# Patient Record
Sex: MALE | Race: WHITE | NOT HISPANIC OR LATINO | URBAN - METROPOLITAN AREA
[De-identification: names, ages, dates, MRNs, and addresses within clinical notes are randomized per-mention and may not be internally consistent; named-entity substitution may affect disease eponyms.]

---
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
## 2025-05-17 ENCOUNTER — OFFICE VISIT (OUTPATIENT)
Dept: URGENT CARE | Facility: CLINIC | Age: 20
End: 2025-05-17
Payer: COMMERCIAL

## 2025-05-17 ENCOUNTER — RESULTS FOLLOW-UP (OUTPATIENT)
Dept: URGENT CARE | Facility: CLINIC | Age: 20
End: 2025-05-17

## 2025-05-17 VITALS
DIASTOLIC BLOOD PRESSURE: 66 MMHG | SYSTOLIC BLOOD PRESSURE: 124 MMHG | OXYGEN SATURATION: 98 % | HEIGHT: 75 IN | WEIGHT: 175 LBS | RESPIRATION RATE: 16 BRPM | HEART RATE: 66 BPM | BODY MASS INDEX: 21.76 KG/M2 | TEMPERATURE: 98 F

## 2025-05-17 DIAGNOSIS — J02.0 ACUTE STREPTOCOCCAL PHARYNGITIS: Primary | ICD-10-CM

## 2025-05-17 LAB — S PYO DNA SPEC NAA+PROBE: DETECTED

## 2025-05-17 PROCEDURE — 3074F SYST BP LT 130 MM HG: CPT | Performed by: NURSE PRACTITIONER

## 2025-05-17 PROCEDURE — 3078F DIAST BP <80 MM HG: CPT | Performed by: NURSE PRACTITIONER

## 2025-05-17 PROCEDURE — 87651 STREP A DNA AMP PROBE: CPT | Performed by: NURSE PRACTITIONER

## 2025-05-17 PROCEDURE — 99213 OFFICE O/P EST LOW 20 MIN: CPT | Performed by: NURSE PRACTITIONER

## 2025-05-17 RX ORDER — AMOXICILLIN 500 MG/1
500 CAPSULE ORAL 2 TIMES DAILY
Qty: 20 CAPSULE | Refills: 0 | Status: SHIPPED | OUTPATIENT
Start: 2025-05-17 | End: 2025-05-27

## 2025-05-17 RX ORDER — DEXAMETHASONE SODIUM PHOSPHATE 10 MG/ML
10 INJECTION, SOLUTION INTRA-ARTICULAR; INTRALESIONAL; INTRAMUSCULAR; INTRAVENOUS; SOFT TISSUE ONCE
Status: COMPLETED | OUTPATIENT
Start: 2025-05-17 | End: 2025-05-17

## 2025-05-17 RX ORDER — AMOXICILLIN 500 MG/1
500 CAPSULE ORAL 2 TIMES DAILY
Qty: 20 CAPSULE | Refills: 0 | Status: SHIPPED | OUTPATIENT
Start: 2025-05-17 | End: 2025-05-17 | Stop reason: SDUPTHER

## 2025-05-17 RX ADMIN — DEXAMETHASONE SODIUM PHOSPHATE 10 MG: 10 INJECTION, SOLUTION INTRA-ARTICULAR; INTRALESIONAL; INTRAMUSCULAR; INTRAVENOUS; SOFT TISSUE at 20:05

## 2025-05-18 NOTE — PROGRESS NOTES
"Chief Complaint   Patient presents with    Pharyngitis     X 1 day/ jaw pain/ ear pain        HISTORY OF PRESENT ILLNESS: Patient is a pleasant 19 y.o. male who presents today due to complaints of a sore throat for the past two days. Reports associated pain with swallowing. Pain is moderate and radiates to ears. Denies associated rash, chest pain, urinary complaints, congestion, cough, or difficulty breathing. He has tried OTC medications at home without much improvement.       Patient Active Problem List    Diagnosis Date Noted    Seasonal allergies 08/07/2023    Other acne 08/07/2023       Allergies:Patient has no known allergies.    Current Medications and Prescriptions Ordered in Epic[1]    Past Medical History[2]    Social History[3]    Family Status   Relation Name Status    Mo  (Not Specified)    MGMo  (Not Specified)    MGFa  (Not Specified)    PGFa  (Not Specified)   No partnership data on file     Family History   Problem Relation Age of Onset    Breast Cancer Mother     Breast Cancer Maternal Grandmother     Cancer Maternal Grandfather         prostate    Heart Disease Maternal Grandfather         amyloidosis, negative genetic screenings    Blood Disease Paternal Grandfather        ROS:  Review of Systems   Constitutional: Negative for fever, chills, malaise, fatigue. Negative for weight loss.  HENT: Positive for sore throat. Negative for ear pain, nosebleeds, congestion.   Eyes: Negative for vision changes.   Cardiovascular: Negative for chest pain, palpitations, orthopnea and leg swelling.   Respiratory: Negative for cough, sputum production, shortness of breath and wheezing.   Gastrointestinal: Negative for abdominal pain, nausea, vomiting or diarrhea.   : Negative for changes in urination.   Skin: Negative for rash, diaphoresis.     Exam:  /66   Pulse 66   Temp 36.7 °C (98 °F) (Temporal)   Resp 16   Ht 1.905 m (6' 3\")   Wt 79.4 kg (175 lb)   SpO2 98%   General: well-nourished, " well-developed male in NAD  Head: normocephalic, atraumatic  Eyes: PERRLA, no conjunctival injection, acuity grossly intact, lids normal.  Ears: normal shape and symmetry, no tenderness, no discharge. External canals are without any significant edema or erythema. Tympanic membranes are without any inflammation, no effusion.  Significant mount of cerumen noted bilaterally.  Gross auditory acuity is intact.  Nose: symmetrical without tenderness, no discharge.  Mouth/Throat: reasonable hygiene. There is erythema, with exudates and tonsillar enlargement present.  Neck: no masses, range of motion within normal limits, no tracheal deviation. No obvious thyroid enlargement.   Lymph: bilateral anterior cervical adenopathy. No supraclavicular adenopathy.   Neuro: alert and oriented. Cranial nerves 1-12 grossly intact. No sensory deficit.   Cardiovascular: regular rate and rhythm. No edema.  Pulmonary: no distress. Chest is symmetrical with respiration, no wheezes, crackles, or rhonchi.   Musculoskeletal: no clubbing, appropriate muscle tone, gait is stable.  Skin: warm, dry, intact, no clubbing, no cyanosis, no rashes.   Psych: appropriate mood, affect, judgement.       POC strep positive      Assessment/Plan:  1. Acute streptococcal pharyngitis  POCT GROUP A STREP, PCR    dexamethasone (Decadron) injection (check route below) 10 mg    amoxicillin (AMOXIL) 500 MG Cap                Antibiotic as directed.  Patient given Decadron in clinic.  OTC motrin or tylenol for pain/fever control. Hand hygiene. Increase fluid intake, rest. Warm salt water gargles.   Supportive care, differential diagnoses, and indications for immediate follow-up discussed with patient.   Pathogenesis of diagnosis discussed including typical length and natural progression.   Instructed to return to clinic or nearest emergency department for any change in condition, further concerns, or worsening of symptoms.  Patient states understanding of the plan of  care and discharge instructions.  Instructed to make an appointment, for follow up, with his primary care provider.        Please note that this dictation was created using voice recognition software. I have made every reasonable attempt to correct obvious errors, but I expect that there are errors of grammar and possibly content that I did not discover before finalizing the note.       AMMY Shirley          [1]   Current Outpatient Medications Ordered in Epic   Medication Sig Dispense Refill    aluminum chloride (DRYSOL) 20 % external solution Apply 1 Application topically every evening. Topical: Apply once daily at bedtime; once excessive sweating has stopped, may decrease to once or twice weekly, or as needed. Wash treated area in the morning. 60 mL 2    lidocaine (XYLOCAINE) 2 % Solution Take 5 mL by mouth every four hours as needed for Throat/Mouth Pain. (Patient not taking: Reported on 5/17/2025) 100 mL 0    tretinoin (RETIN-A) 0.025 % cream PLEASE SEE ATTACHED FOR DETAILED DIRECTIONS (Patient not taking: Reported on 5/17/2025)       Current Facility-Administered Medications Ordered in Epic   Medication Dose Route Frequency Provider Last Rate Last Admin    dexamethasone (Decadron) injection (check route below) 10 mg  10 mg Oral Once        [2]   Past Medical History:  Diagnosis Date    Closed avulsion fracture of left ankle 2/13/2023    Displaced fracture of lateral malleolus of left fibula, initial encounter for closed fracture 2/13/2023    Jaundice     At birth.    Outbursts of anger 1/18/2023    Pneumonia     In 5th grade.   [3]   Social History  Tobacco Use    Smoking status: Never    Smokeless tobacco: Never   Vaping Use    Vaping status: Some Days    Substances: Nicotine, THC   Substance Use Topics    Alcohol use: Yes     Comment: socially    Drug use: Not Currently     Types: Marijuana     Comment: Catarina

## 2025-08-01 ENCOUNTER — OFFICE VISIT (OUTPATIENT)
Dept: URGENT CARE | Facility: CLINIC | Age: 20
End: 2025-08-01
Payer: COMMERCIAL

## 2025-08-01 VITALS
HEART RATE: 87 BPM | HEIGHT: 75 IN | RESPIRATION RATE: 16 BRPM | BODY MASS INDEX: 21.14 KG/M2 | DIASTOLIC BLOOD PRESSURE: 70 MMHG | OXYGEN SATURATION: 100 % | TEMPERATURE: 99.5 F | SYSTOLIC BLOOD PRESSURE: 130 MMHG | WEIGHT: 170 LBS

## 2025-08-01 DIAGNOSIS — J35.8 TONSIL STONE: ICD-10-CM

## 2025-08-01 DIAGNOSIS — J02.0 STREP PHARYNGITIS: Primary | ICD-10-CM

## 2025-08-01 DIAGNOSIS — J02.9 PHARYNGITIS, UNSPECIFIED ETIOLOGY: ICD-10-CM

## 2025-08-01 LAB — S PYO DNA SPEC NAA+PROBE: DETECTED

## 2025-08-01 PROCEDURE — 87651 STREP A DNA AMP PROBE: CPT | Performed by: NURSE PRACTITIONER

## 2025-08-01 PROCEDURE — 99213 OFFICE O/P EST LOW 20 MIN: CPT | Performed by: NURSE PRACTITIONER

## 2025-08-01 PROCEDURE — 3078F DIAST BP <80 MM HG: CPT | Performed by: NURSE PRACTITIONER

## 2025-08-01 PROCEDURE — 3075F SYST BP GE 130 - 139MM HG: CPT | Performed by: NURSE PRACTITIONER

## 2025-08-01 RX ORDER — DEXAMETHASONE 0.5 MG/5ML
ELIXIR ORAL
Qty: 237 ML | Refills: 0 | Status: SHIPPED | OUTPATIENT
Start: 2025-08-01

## 2025-08-01 RX ORDER — LIDOCAINE HYDROCHLORIDE 20 MG/ML
15 SOLUTION OROPHARYNGEAL PRN
Qty: 1200 ML | Refills: 0 | Status: SHIPPED | OUTPATIENT
Start: 2025-08-01

## 2025-08-01 RX ORDER — AMOXICILLIN 500 MG/1
500 CAPSULE ORAL 2 TIMES DAILY
Qty: 20 CAPSULE | Refills: 0 | Status: SHIPPED | OUTPATIENT
Start: 2025-08-01 | End: 2025-08-11

## 2025-09-24 ENCOUNTER — APPOINTMENT (OUTPATIENT)
Dept: MEDICAL GROUP | Facility: MEDICAL CENTER | Age: 20
End: 2025-09-24
Payer: COMMERCIAL

## (undated) DEVICE — SUTURE 3-0 MONOCRYL PLUS PS-1 - 27 INCH (36/BX)

## (undated) DEVICE — ELECTRODE DUAL RETURN W/ CORD - (50/PK)

## (undated) DEVICE — CLOSURE SKIN STRIP 1/2 X 4 IN - (STERI STRIP) (50/BX 4BX/CA)

## (undated) DEVICE — SENSOR OXIMETER ADULT SPO2 RD SET (20EA/BX)

## (undated) DEVICE — SODIUM CHL IRRIGATION 0.9% 1000ML (12EA/CA)

## (undated) DEVICE — SET LEADWIRE 5 LEAD BEDSIDE DISPOSABLE ECG (1SET OF 5/EA)

## (undated) DEVICE — TOWEL STOP TIMEOUT SAFETY FLAG (40EA/CA)

## (undated) DEVICE — DRILL 2.5 MM SURGICAL  AO QUICK CONNECT SHORT EVOS

## (undated) DEVICE — TUBING CLEARLINK DUO-VENT - C-FLO (48EA/CA)

## (undated) DEVICE — SUCTION INSTRUMENT YANKAUER BULBOUS TIP W/O VENT (50EA/CA)

## (undated) DEVICE — CANISTER SUCTION 3000ML MECHANICAL FILTER AUTO SHUTOFF MEDI-VAC NONSTERILE LF DISP  (40EA/CA)

## (undated) DEVICE — GOWN WARMING STANDARD FLEX - (30/CA)

## (undated) DEVICE — PACK LOWER EXTREMITY - (2/CA)

## (undated) DEVICE — COVER LIGHT HANDLE ALC PLUS DISP (18EA/BX)

## (undated) DEVICE — SLEEVE VASO CALF MED - (10PR/CA)

## (undated) DEVICE — DRESSING 3X8 ADAPTIC GAUZE - NON-ADHERING (36/PK 6PK/BX)

## (undated) DEVICE — DRILL 2.0 MM SURGICAL SHORT EVOS

## (undated) DEVICE — DRAPE C-ARM LARGE 41IN X 74 IN - (10/BX 2BX/CA)

## (undated) DEVICE — CHLORAPREP 26 ML APPLICATOR - ORANGE TINT(25/CA)

## (undated) DEVICE — SUTURE GENERAL

## (undated) DEVICE — SET EXTENSION WITH 2 PORTS (48EA/CA) ***PART #2C8610 IS A SUBSTITUTE*****

## (undated) DEVICE — BANDAGE ELASTIC 6 HONEYCOMB - 6X5YD LF (20/CA)"

## (undated) DEVICE — PADDING CAST 6 IN STERILE - 6 X 4 YDS (24/CA)

## (undated) DEVICE — SUTURE 2-0 VICRYL PLUS SH - 27 INCH (36/BX)

## (undated) DEVICE — LACTATED RINGERS INJ 1000 ML - (14EA/CA 60CA/PF)